# Patient Record
Sex: FEMALE | Race: WHITE | URBAN - METROPOLITAN AREA
[De-identification: names, ages, dates, MRNs, and addresses within clinical notes are randomized per-mention and may not be internally consistent; named-entity substitution may affect disease eponyms.]

---

## 2017-02-09 ENCOUNTER — TELEPHONE (OUTPATIENT)
Dept: FAMILY MEDICINE | Facility: CLINIC | Age: 52
End: 2017-02-09

## 2017-02-09 NOTE — TELEPHONE ENCOUNTER
Reason for call:  Symptom  Reason for call:  Patient reporting a symptom    Symptom or request: chest pains dizzy    Duration (how long have symptoms been present): today    Have you been treated for this before? No    Additional comments: had cold virus last week and now patient is having chest pains and dizzy     Phone Number patient can be reached at:  Cell number on file:    Telephone Information:   Mobile [phone]       Best Time:  asap    Can we leave a detailed message on this number:  YES    Call taken on 2/9/2017 at 4:31 PM by Kayla Goins

## 2017-02-09 NOTE — TELEPHONE ENCOUNTER
Rosa Isela Mccartney is a 52 year old female who calls with chest pain.    NURSING ASSESSMENT:  Description:  Chest pain present past few weeks. Worsening. Today goes down neck into chest and into the back near shoulder blade. Sob present, hard to take a breath. Heart feels like it races off and on; occasionally skips beats. Dull, achy pain. Dizziness present.   Onset/duration:  Pain few weeks, sob/dizziness today  Precip. factors:  Family history of heart related illnesses  Associated symptoms:  See above  Improves/worsens symptoms:  n/a  Pain scale (0-10)   3/10  LMP/preg/breast feeding:  n/a  Last exam/Treatment:  10/19/16  Allergies: No Known Allergies    MEDICATIONS:   Taking medication(s) as prescribed? N/A  Taking over the counter medication(s?) N/A  Any medication side effects? Not Applicable    Any barriers to taking medication(s) as prescribed?  No  Medication(s) improving/managing symptoms?  N/A  Medication reconciliation completed: N/A      NURSING PLAN: Nursing advice to patient ED    RECOMMENDED DISPOSITION:  To ED, another person to drive - ED evaluation needed. Patient states will go now.   Will comply with recommendation: Yes  If further questions/concerns or if symptoms do not improve, worsen or new symptoms develop, call your PCP or [facility] as soon as possible.      Guideline used:  Telephone Triage Protocols for Nurses, Fourth Edition, Desiree Carrillo  Chest pain     Carmelita Burleson RN

## 2017-12-17 ENCOUNTER — HEALTH MAINTENANCE LETTER (OUTPATIENT)
Age: 52
End: 2017-12-17

## 2020-03-02 ENCOUNTER — HEALTH MAINTENANCE LETTER (OUTPATIENT)
Age: 55
End: 2020-03-02

## 2020-12-20 ENCOUNTER — HEALTH MAINTENANCE LETTER (OUTPATIENT)
Age: 55
End: 2020-12-20

## 2021-04-24 ENCOUNTER — HEALTH MAINTENANCE LETTER (OUTPATIENT)
Age: 56
End: 2021-04-24

## 2021-10-03 ENCOUNTER — HEALTH MAINTENANCE LETTER (OUTPATIENT)
Age: 56
End: 2021-10-03

## 2022-03-20 ENCOUNTER — HEALTH MAINTENANCE LETTER (OUTPATIENT)
Age: 57
End: 2022-03-20

## 2022-05-15 ENCOUNTER — HEALTH MAINTENANCE LETTER (OUTPATIENT)
Age: 57
End: 2022-05-15

## 2022-09-10 ENCOUNTER — HEALTH MAINTENANCE LETTER (OUTPATIENT)
Age: 57
End: 2022-09-10

## 2023-07-23 ENCOUNTER — HEALTH MAINTENANCE LETTER (OUTPATIENT)
Age: 58
End: 2023-07-23

## 2024-08-12 ENCOUNTER — TRANSCRIBE ORDERS (OUTPATIENT)
Dept: OTHER | Age: 59
End: 2024-08-12

## 2024-08-12 ENCOUNTER — PATIENT OUTREACH (OUTPATIENT)
Dept: ONCOLOGY | Facility: CLINIC | Age: 59
End: 2024-08-12
Payer: COMMERCIAL

## 2024-08-12 ENCOUNTER — PRE VISIT (OUTPATIENT)
Dept: ONCOLOGY | Facility: CLINIC | Age: 59
End: 2024-08-12
Payer: COMMERCIAL

## 2024-08-12 DIAGNOSIS — N95.0 POST-MENOPAUSAL BLEEDING: Primary | ICD-10-CM

## 2024-08-12 NOTE — TELEPHONE ENCOUNTER
RECORDS STATUS - ALL OTHER DIAGNOSIS      RECORDS RECEIVED FROM: Ro/ Tim [facility]/ [facility]    Appt Date: TBD NN WQ    Post-menopausal bleeding  Action    Action Taken 8/12/2024 12:46pm ROBEL     I called [facility]'s IMG Dept Ph: [phone] - they will push scans to  PACS    I called Tim [facility] Ph: [phone] - I was transferred to the MR Dept - they will fax records to [phone] Attn: Catherine    8/12/2024 4:52pm ROBEL   Received some [facility] recs- sent to HIM/NN team        NOTES STATUS DETAILS   OFFICE NOTE from referring provider [facility] 07/29/24: Dr. Taylor Sargent   OFFICE NOTE from medical oncologist [facility] 05/20/24: Marcial Krause   OFFICE NOTE from OBGYN [facility] 07/18/24: Dr. David León   DISCHARGE REPORT from the ER [facility] 07/09/24: [facility] ED   OPERATIVE REPORT [facility] 08/05/24: Diagnostic hysteroscopy, dilation and curettage    MEDICATION LIST [facility]    CLINICAL TRIAL TREATMENTS TO DATE     LABS     PATHOLOGY REPORTS Reports in [facility] 08/05/24: [id number]    05/25/22: PAP & HPV   ANYTHING RELATED TO DIAGNOSIS [facility] Most recent 08/05/24   PATHOLOGY [facility] TRACKING   TRACKING #:   GENONOMIC TESTING     TYPE:     IMAGING (NEED IMAGES & REPORT)     CT SCANS Requested- Ro CT Chest Abdomen 7/15/2022, 1/3/2022, 8/13/2021    ULTRASOUND Requested- Tim [facility]- They are able to push to [facility] PACS Pelvic US July 9th 2024

## 2024-08-12 NOTE — PROGRESS NOTES
"Patient returned call. Reports she is planning to establish with GynOnc at [place].     Per patient, her mother was referred to [facility] 2-3 years ago for ovarian cancer. Referral was delayed at that time due to \"communication issues\" as her mother had a land line and they were unable to reach someone in Oncology Scheduling in a timely fashion. Once her mother established care, she  a few weeks later.     Because of this experience, she is hesitant to establish through [facility].     Writer provided emotional support towards her past experience. Explained how [facility] now has a Nurse Navigation Role and how we help to support patients/family through the referral process. Provided my direct contact number if any future scheduling needs arise. Patient was thankful for the call and conversation.   "
Addendum 8/12/24 1339: Records now viewable in [facility]. Notes state patient is interested in scheduling with [facility]. Will call to confirm plan and follow-up as needed.    OUTGOING CALL to pt, no answer.     LVM introducing my role as nurse navigator with [facility] and that we have recd the referral to GynOnc from Dr León.    Requested call back to discuss referral and whether she is interested in scheduling consultation.     Provided my direct call back number.    Ashley Bangura, CHRISTOPHN, RN, PHN, OCN  Hematology/Oncology Nurse Navigator  [facility]  [phone]    
New Patient Hematology / Oncology Nurse Navigator Note     Referral Date: 8/12/24    Referring provider:   David León MD   [address] [address]   Phone: [phone]   Fax: [phone]     Referring Clinic/Organization: [facility]  Other - [facility]     Referred to: GynOnc    Requested provider (if applicable): Dr. Altamirano requested on referral (will discuss alternative options as Dr. Altamirano is currently out of office)     Evaluation for :        Clinical History (per Nurse review of records provided):    8/5/24 Path:      Clinical Assessment / Barriers to Care (Per Nurse):  Pt lives in [facility]     Records Location: Care Everywhere     Records Needed:   Need records from [place] (imaging, path results, labs, last PAP/HPV) unable to view in [facility] without patient consent. Flagged for records intake.     Additional testing needed prior to consult:   N/A    Referral updates and Plan:   Flagged for records intake. Hold placed tentatively on Dr. Bobby's schedule 8/23 at [place].     Ashley Bangura, BSN, RN, PHN, OCN  Hematology/Oncology Nurse Navigator  [facility]  [phone]    
Patient/Caregiver provided printed discharge information.

## 2024-09-15 ENCOUNTER — HEALTH MAINTENANCE LETTER (OUTPATIENT)
Age: 59
End: 2024-09-15